# Patient Record
Sex: FEMALE | Race: BLACK OR AFRICAN AMERICAN | NOT HISPANIC OR LATINO | URBAN - METROPOLITAN AREA
[De-identification: names, ages, dates, MRNs, and addresses within clinical notes are randomized per-mention and may not be internally consistent; named-entity substitution may affect disease eponyms.]

---

## 2018-06-23 ENCOUNTER — INPATIENT (INPATIENT)
Facility: HOSPITAL | Age: 83
LOS: 0 days | Discharge: HOME HEALTH | DRG: 175 | End: 2018-06-24
Attending: INTERNAL MEDICINE | Admitting: INTERNAL MEDICINE
Payer: MEDICARE

## 2018-06-23 VITALS
TEMPERATURE: 98 F | RESPIRATION RATE: 20 BRPM | DIASTOLIC BLOOD PRESSURE: 67 MMHG | HEART RATE: 112 BPM | OXYGEN SATURATION: 98 % | SYSTOLIC BLOOD PRESSURE: 91 MMHG

## 2018-06-23 DIAGNOSIS — R26.2 DIFFICULTY IN WALKING, NOT ELSEWHERE CLASSIFIED: ICD-10-CM

## 2018-06-23 DIAGNOSIS — Z95.0 PRESENCE OF CARDIAC PACEMAKER: ICD-10-CM

## 2018-06-23 DIAGNOSIS — Z71.89 OTHER SPECIFIED COUNSELING: ICD-10-CM

## 2018-06-23 DIAGNOSIS — R56.9 UNSPECIFIED CONVULSIONS: ICD-10-CM

## 2018-06-23 DIAGNOSIS — R63.8 OTHER SYMPTOMS AND SIGNS CONCERNING FOOD AND FLUID INTAKE: ICD-10-CM

## 2018-06-23 DIAGNOSIS — I26.99 OTHER PULMONARY EMBOLISM WITHOUT ACUTE COR PULMONALE: ICD-10-CM

## 2018-06-23 DIAGNOSIS — N39.0 URINARY TRACT INFECTION, SITE NOT SPECIFIED: ICD-10-CM

## 2018-06-23 DIAGNOSIS — R64 CACHEXIA: ICD-10-CM

## 2018-06-23 DIAGNOSIS — I71.9 AORTIC ANEURYSM OF UNSPECIFIED SITE, WITHOUT RUPTURE: ICD-10-CM

## 2018-06-23 DIAGNOSIS — E78.5 HYPERLIPIDEMIA, UNSPECIFIED: ICD-10-CM

## 2018-06-23 DIAGNOSIS — Z29.9 ENCOUNTER FOR PROPHYLACTIC MEASURES, UNSPECIFIED: ICD-10-CM

## 2018-06-23 DIAGNOSIS — E04.9 NONTOXIC GOITER, UNSPECIFIED: ICD-10-CM

## 2018-06-23 DIAGNOSIS — Z95.0 PRESENCE OF CARDIAC PACEMAKER: Chronic | ICD-10-CM

## 2018-06-23 LAB
ALBUMIN SERPL ELPH-MCNC: 3.4 G/DL — SIGNIFICANT CHANGE UP (ref 3.3–5)
ALP SERPL-CCNC: 74 U/L — SIGNIFICANT CHANGE UP (ref 40–120)
ALT FLD-CCNC: 10 U/L — SIGNIFICANT CHANGE UP (ref 10–45)
ANION GAP SERPL CALC-SCNC: 9 MMOL/L — SIGNIFICANT CHANGE UP (ref 5–17)
APPEARANCE UR: CLEAR — SIGNIFICANT CHANGE UP
APTT BLD: 30 SEC — SIGNIFICANT CHANGE UP (ref 27.5–37.4)
AST SERPL-CCNC: 23 U/L — SIGNIFICANT CHANGE UP (ref 10–40)
BACTERIA # UR AUTO: ABNORMAL /HPF
BASOPHILS NFR BLD AUTO: 0.4 % — SIGNIFICANT CHANGE UP (ref 0–2)
BILIRUB SERPL-MCNC: 0.6 MG/DL — SIGNIFICANT CHANGE UP (ref 0.2–1.2)
BILIRUB UR-MCNC: NEGATIVE — SIGNIFICANT CHANGE UP
BUN SERPL-MCNC: 20 MG/DL — SIGNIFICANT CHANGE UP (ref 7–23)
CALCIUM SERPL-MCNC: 9.8 MG/DL — SIGNIFICANT CHANGE UP (ref 8.4–10.5)
CHLORIDE SERPL-SCNC: 97 MMOL/L — SIGNIFICANT CHANGE UP (ref 96–108)
CO2 SERPL-SCNC: 31 MMOL/L — SIGNIFICANT CHANGE UP (ref 22–31)
COLOR SPEC: YELLOW — SIGNIFICANT CHANGE UP
COMMENT - URINE: SIGNIFICANT CHANGE UP
CREAT SERPL-MCNC: 0.78 MG/DL — SIGNIFICANT CHANGE UP (ref 0.5–1.3)
DIFF PNL FLD: ABNORMAL
EOSINOPHIL NFR BLD AUTO: 1.1 % — SIGNIFICANT CHANGE UP (ref 0–6)
EPI CELLS # UR: ABNORMAL /HPF (ref 0–5)
GLUCOSE BLDC GLUCOMTR-MCNC: 84 MG/DL — SIGNIFICANT CHANGE UP (ref 70–99)
GLUCOSE BLDC GLUCOMTR-MCNC: 84 MG/DL — SIGNIFICANT CHANGE UP (ref 70–99)
GLUCOSE SERPL-MCNC: 95 MG/DL — SIGNIFICANT CHANGE UP (ref 70–99)
GLUCOSE UR QL: NEGATIVE — SIGNIFICANT CHANGE UP
HCT VFR BLD CALC: 33.8 % — LOW (ref 34.5–45)
HGB BLD-MCNC: 11.1 G/DL — LOW (ref 11.5–15.5)
INR BLD: 1.12 — SIGNIFICANT CHANGE UP (ref 0.88–1.16)
KETONES UR-MCNC: 15 MG/DL
LEUKOCYTE ESTERASE UR-ACNC: ABNORMAL
LYMPHOCYTES # BLD AUTO: 21 % — SIGNIFICANT CHANGE UP (ref 13–44)
MCHC RBC-ENTMCNC: 30.1 PG — SIGNIFICANT CHANGE UP (ref 27–34)
MCHC RBC-ENTMCNC: 32.8 G/DL — SIGNIFICANT CHANGE UP (ref 32–36)
MCV RBC AUTO: 91.6 FL — SIGNIFICANT CHANGE UP (ref 80–100)
MONOCYTES NFR BLD AUTO: 10.3 % — SIGNIFICANT CHANGE UP (ref 2–14)
NEUTROPHILS NFR BLD AUTO: 67.2 % — SIGNIFICANT CHANGE UP (ref 43–77)
NITRITE UR-MCNC: POSITIVE
PH UR: 5.5 — SIGNIFICANT CHANGE UP (ref 5–8)
PLATELET # BLD AUTO: 162 K/UL — SIGNIFICANT CHANGE UP (ref 150–400)
POTASSIUM SERPL-MCNC: 4 MMOL/L — SIGNIFICANT CHANGE UP (ref 3.5–5.3)
POTASSIUM SERPL-SCNC: 4 MMOL/L — SIGNIFICANT CHANGE UP (ref 3.5–5.3)
PROT SERPL-MCNC: 6.4 G/DL — SIGNIFICANT CHANGE UP (ref 6–8.3)
PROT UR-MCNC: ABNORMAL MG/DL
PROTHROM AB SERPL-ACNC: 12.5 SEC — SIGNIFICANT CHANGE UP (ref 9.8–12.7)
RBC # BLD: 3.69 M/UL — LOW (ref 3.8–5.2)
RBC # FLD: 12.3 % — SIGNIFICANT CHANGE UP (ref 10.3–16.9)
RBC CASTS # UR COMP ASSIST: > 10 /HPF
SODIUM SERPL-SCNC: 137 MMOL/L — SIGNIFICANT CHANGE UP (ref 135–145)
SP GR SPEC: 1.01 — SIGNIFICANT CHANGE UP (ref 1–1.03)
UROBILINOGEN FLD QL: 1 E.U./DL — SIGNIFICANT CHANGE UP
WBC # BLD: 4.8 K/UL — SIGNIFICANT CHANGE UP (ref 3.8–10.5)
WBC # FLD AUTO: 4.8 K/UL — SIGNIFICANT CHANGE UP (ref 3.8–10.5)
WBC UR QL: ABNORMAL /HPF

## 2018-06-23 PROCEDURE — 71045 X-RAY EXAM CHEST 1 VIEW: CPT | Mod: 26

## 2018-06-23 PROCEDURE — 99223 1ST HOSP IP/OBS HIGH 75: CPT | Mod: GC

## 2018-06-23 PROCEDURE — 99285 EMERGENCY DEPT VISIT HI MDM: CPT | Mod: 25

## 2018-06-23 PROCEDURE — 71275 CT ANGIOGRAPHY CHEST: CPT | Mod: 26

## 2018-06-23 PROCEDURE — 93010 ELECTROCARDIOGRAM REPORT: CPT

## 2018-06-23 RX ORDER — SODIUM CHLORIDE 9 MG/ML
1000 INJECTION, SOLUTION INTRAVENOUS
Qty: 0 | Refills: 0 | Status: DISCONTINUED | OUTPATIENT
Start: 2018-06-23 | End: 2018-06-24

## 2018-06-23 RX ORDER — HEPARIN SODIUM 5000 [USP'U]/ML
INJECTION INTRAVENOUS; SUBCUTANEOUS
Qty: 25000 | Refills: 0 | Status: DISCONTINUED | OUTPATIENT
Start: 2018-06-23 | End: 2018-06-24

## 2018-06-23 RX ORDER — ASPIRIN/CALCIUM CARB/MAGNESIUM 324 MG
1 TABLET ORAL
Qty: 0 | Refills: 0 | COMMUNITY

## 2018-06-23 RX ORDER — DEXTROSE 50 % IN WATER 50 %
25 SYRINGE (ML) INTRAVENOUS ONCE
Qty: 0 | Refills: 0 | Status: DISCONTINUED | OUTPATIENT
Start: 2018-06-23 | End: 2018-06-24

## 2018-06-23 RX ORDER — HEPARIN SODIUM 5000 [USP'U]/ML
2000 INJECTION INTRAVENOUS; SUBCUTANEOUS ONCE
Qty: 0 | Refills: 0 | Status: DISCONTINUED | OUTPATIENT
Start: 2018-06-23 | End: 2018-06-23

## 2018-06-23 RX ORDER — DEXTROSE 50 % IN WATER 50 %
12.5 SYRINGE (ML) INTRAVENOUS ONCE
Qty: 0 | Refills: 0 | Status: DISCONTINUED | OUTPATIENT
Start: 2018-06-23 | End: 2018-06-24

## 2018-06-23 RX ORDER — LEVETIRACETAM 250 MG/1
1 TABLET, FILM COATED ORAL
Qty: 0 | Refills: 0 | COMMUNITY

## 2018-06-23 RX ORDER — ASPIRIN/CALCIUM CARB/MAGNESIUM 324 MG
81 TABLET ORAL DAILY
Qty: 0 | Refills: 0 | Status: DISCONTINUED | OUTPATIENT
Start: 2018-06-23 | End: 2018-06-24

## 2018-06-23 RX ORDER — CEFUROXIME AXETIL 250 MG
250 TABLET ORAL ONCE
Qty: 0 | Refills: 0 | Status: COMPLETED | OUTPATIENT
Start: 2018-06-23 | End: 2018-06-23

## 2018-06-23 RX ORDER — ATORVASTATIN CALCIUM 80 MG/1
40 TABLET, FILM COATED ORAL AT BEDTIME
Qty: 0 | Refills: 0 | Status: DISCONTINUED | OUTPATIENT
Start: 2018-06-23 | End: 2018-06-24

## 2018-06-23 RX ORDER — HEPARIN SODIUM 5000 [USP'U]/ML
2000 INJECTION INTRAVENOUS; SUBCUTANEOUS EVERY 6 HOURS
Qty: 0 | Refills: 0 | Status: DISCONTINUED | OUTPATIENT
Start: 2018-06-23 | End: 2018-06-23

## 2018-06-23 RX ORDER — SODIUM CHLORIDE 9 MG/ML
500 INJECTION INTRAMUSCULAR; INTRAVENOUS; SUBCUTANEOUS ONCE
Qty: 0 | Refills: 0 | Status: COMPLETED | OUTPATIENT
Start: 2018-06-23 | End: 2018-06-23

## 2018-06-23 RX ORDER — CEFTRIAXONE 500 MG/1
1 INJECTION, POWDER, FOR SOLUTION INTRAMUSCULAR; INTRAVENOUS EVERY 24 HOURS
Qty: 0 | Refills: 0 | Status: DISCONTINUED | OUTPATIENT
Start: 2018-06-24 | End: 2018-06-24

## 2018-06-23 RX ORDER — LEVETIRACETAM 250 MG/1
500 TABLET, FILM COATED ORAL
Qty: 0 | Refills: 0 | Status: DISCONTINUED | OUTPATIENT
Start: 2018-06-23 | End: 2018-06-24

## 2018-06-23 RX ORDER — GLUCAGON INJECTION, SOLUTION 0.5 MG/.1ML
1 INJECTION, SOLUTION SUBCUTANEOUS ONCE
Qty: 0 | Refills: 0 | Status: DISCONTINUED | OUTPATIENT
Start: 2018-06-23 | End: 2018-06-24

## 2018-06-23 RX ORDER — DEXTROSE 50 % IN WATER 50 %
15 SYRINGE (ML) INTRAVENOUS ONCE
Qty: 0 | Refills: 0 | Status: DISCONTINUED | OUTPATIENT
Start: 2018-06-23 | End: 2018-06-24

## 2018-06-23 RX ORDER — HEPARIN SODIUM 5000 [USP'U]/ML
4000 INJECTION INTRAVENOUS; SUBCUTANEOUS EVERY 6 HOURS
Qty: 0 | Refills: 0 | Status: DISCONTINUED | OUTPATIENT
Start: 2018-06-23 | End: 2018-06-23

## 2018-06-23 RX ORDER — ATORVASTATIN CALCIUM 80 MG/1
1 TABLET, FILM COATED ORAL
Qty: 0 | Refills: 0 | COMMUNITY

## 2018-06-23 RX ORDER — INSULIN LISPRO 100/ML
VIAL (ML) SUBCUTANEOUS
Qty: 0 | Refills: 0 | Status: DISCONTINUED | OUTPATIENT
Start: 2018-06-23 | End: 2018-06-24

## 2018-06-23 RX ADMIN — Medication 250 MILLIGRAM(S): at 19:53

## 2018-06-23 RX ADMIN — SODIUM CHLORIDE 1000 MILLILITER(S): 9 INJECTION INTRAMUSCULAR; INTRAVENOUS; SUBCUTANEOUS at 15:37

## 2018-06-23 RX ADMIN — HEPARIN SODIUM 900 UNIT(S)/HR: 5000 INJECTION INTRAVENOUS; SUBCUTANEOUS at 21:32

## 2018-06-23 NOTE — ED PROVIDER NOTE - OBJECTIVE STATEMENT
89 yo female h/o sz on keppra, hld, htn, arthritis, s/p pacer, bed bound s/p fall Nov 2017 c/o choking episode.  Pt was at home, took her medications and shortly afterward was noted to be having problems breathing.  Family called 911 and pt was brought to the ed.  In ed, pt appears to be breathing normally per family.  Pt c/o recent sore throat x 2 d, no cough, fever, sob prior to today.  Pt has chronic pain in body - unchanged today.  No cp, palpitations.  No abd pain, n/v/d.  No h/o lung issues. 89 yo female h/o sz on keppra, hld, htn, arthritis, s/p pacer, bed bound s/p fall Nov 2017 c/o choking episode.  Pt was at home, took her medications and shortly afterward was noted to be having problems breathing.  Family called 911 and pt was brought to the ed.  In ed, pt appears to be breathing normally per family.  Pt c/o recent sore throat x 2 d, no cough, fever, sob prior to today.  Pt has chronic pain in body - unchanged today.  No cp, palpitations.  No abd pain, n/v/d.  No h/o lung issues.  No sz activity noted.

## 2018-06-23 NOTE — ED PROVIDER NOTE - MEDICAL DECISION MAKING DETAILS
Pt w likely choking episode at home.  Sat 100% on ra on my eval, lung cta, no resp distress.  BP low on arrival but nl on my exam.  Will monitor vs for deterioration or fever from ? aspiration, cxr, labs; poss dc home. Pt w likely choking episode at home.  Sat 100% on ra on my eval, lung cta, no resp distress.  BP low on arrival but nl on my exam.  Will monitor vs for deterioration or fever from ? aspiration, cxr, labs, low suspicion for pe (pt bedbound); poss dc home. Pt w likely choking episode at home.  Sat 100% on ra on my eval, lung cta, no resp distress.  BP low on arrival but nl on my exam.  Will monitor vs for deterioration or fever from ? aspiration.  Low suspicion for pe (although some risk since pt bedbound).  Plan cxr, labs; poss dc home.

## 2018-06-23 NOTE — ED PROVIDER NOTE - PROGRESS NOTE DETAILS
Pt resting comfortably.  Sat 91-95% while sleeping, family feels pt's breathing labored but appears nl to me.  Labs nl.  CXR w scar vs atelectasis on my read.  Doubt pe but pt w lower sat now which could be due to her sleeping, but will get cta for pe.  If neg, plan dc home. + uti - abx ordered.  CT + pe, no evidence of R heart strain.  BNP, trop added to labs; plan heparin after pt weighed.  Plan for admit but need bnp/trop to eval for floor vs tele bed; no sig suspicion for submassive pe.  VSS, pt resting comfortably w/o resp distress. Trop neg, bnp nl for pt's age.  Will admit to floor.

## 2018-06-23 NOTE — ED PROVIDER NOTE - ENMT, MLM
Airway patent, Nasal mucosa clear. Mouth with normal mucosa. Throat has no vesicles, no oropharyngeal exudates and uvula is midline. no fb, no erythema

## 2018-06-23 NOTE — H&P ADULT - PROBLEM SELECTOR PLAN 6
nutritional consult CTA: Large substernal thyroid goiter  - thyroid panel c/w Lipitor 40 Family uncertain patient really has seizures. Currently on Keppra 500mg BID  c/w keppra 500mg BID  provide neurologist at discharge- patients family request transition from [facility] to [facility]

## 2018-06-23 NOTE — H&P ADULT - ATTENDING COMMENTS
patient seen and examined; additional HPI/ ROS: pt reports being weaker and bedbound for the last month. Has been having loss of appetite x 2 weeks, urinary frequency x1 month, experienced one time vomiting 1 week ago. denies chest pain/ dyspnea currently    reviewed vs, labs, available radiological reports/ studies, ekg     agree w/ PE findings as above w/ additions/ exceptions/ pertinent findings: thin, elderly female, soft spoken, awake, alert, oriented x 3; mildly dry MM, moves all extremities; rest of exam as above     1. PE   2. UTI   3.  cachexia/ NEIL/ LOW       rest of plan as above patient seen and examined; additional HPI/ ROS: pt reports being weaker and bedbound for the last month, usually ambulates with walker, also uses wheelchair. Has been having loss of appetite x 2 weeks, urinary frequency x1 month, experienced one time vomiting 1 week ago. denies chest pain/ dyspnea currently; reports urinary incontinence while bedbound.     reviewed vs, labs, available radiological reports/ studies, ekg     agree w/ PE findings as above w/ additions/ exceptions/ pertinent findings: thin, elderly female, soft spoken, awake, alert, oriented x 3; mildly dry MM, no focal CN motor deficit noted; 3/5 upper motor strength b/l, 2/5 lower motor strength b/l ; rest of exam as above     1. PE : on heparin gtt, monitor PTT; agree w/ dopplers, ECHO   2. UTI : on ceftriaxone, follow up ctx  3.  weakness/ cachexia/ NEIL/ LOW : upper and lower motor weakness, more pronounced at lower ext, occurring for the last month; given acuity , will check  brain and spine imaging   4. goiter: check thyroid studies, further sonogram imaging       rest of plan as above patient seen and examined; additional HPI/ ROS: pt reports being weaker and bedbound for the last month, usually ambulates with walker, also uses wheelchair. Has been having loss of appetite x 2 weeks, urinary frequency x1 month, experienced one time vomiting 1 week ago. denies chest pain/ dyspnea currently; reports urinary incontinence while bedbound.     reviewed vs, labs, available radiological reports/ studies, ekg     agree w/ PE findings as above w/ additions/ exceptions/ pertinent findings: thin, elderly female, soft spoken, awake, alert, oriented x 3; mildly dry MM, no focal CN motor deficit noted; 3/5 upper motor strength b/l, 2/5 lower motor strength b/l ; rest of exam as above     1. PE : on heparin gtt, monitor PTT; agree w/ dopplers, ECHO   2. UTI : on ceftriaxone, follow up ctxs  3.  weakness/ cachexia/ NEIL/ LOW : upper and lower motor weakness, more pronounced at lower ext, occurring for the last month; per family pt has been bedbound since 11/2017 after a fall; appears chronically deconditioned; further workup as outpatient.   4. large substernal goiter: check thyroid studies, further sonogram imaging       rest of plan as above

## 2018-06-23 NOTE — H&P ADULT - NSHPPHYSICALEXAM_GEN_ALL_CORE
.  VITAL SIGNS:  T(C): 36.7 (06-23-18 @ 18:49), Max: 37.3 (06-23-18 @ 14:48)  T(F): 98.1 (06-23-18 @ 18:49), Max: 99.1 (06-23-18 @ 14:48)  HR: 80 (06-23-18 @ 18:49) (80 - 112)  BP: 144/88 (06-23-18 @ 18:49) (91/67 - 144/88)  BP(mean): --  RR: 18 (06-23-18 @ 18:49) (18 - 20)  SpO2: 100% (06-23-18 @ 18:49) (93% - 100%)  Wt(kg): --    PHYSICAL EXAM:    Constitutional: cachectic, in no acute distress  Head: temporal wasting   Eyes: PERRL, EOMI, clear conjunctiva  ENT: dry mucous membranes   Neck: supple; no JVD   Respiratory: CTA B/L; no W/R/R  Cardiac: +S1/S2; RRR; no M/R/G  Gastrointestinal: soft, NT/ND; no rebound or guarding  Extremities: WWP, no clubbing or cyanosis; no peripheral edema  Musculoskeletal: no joint swelling, tenderness or erythema  Vascular: 2+ radial, femoral, DP/PT pulses B/L  Neurologic: AAOx3; no focal deficits

## 2018-06-23 NOTE — H&P ADULT - PROBLEM SELECTOR PLAN 5
c/w Lipitor 40 Family uncertain patient really has seizures. Currently on Keppra 500mg BID  c/w keppra 500mg BID  provide neurologist at discharge- patients family request transition from [facility] to [facility] Visualized on CXR. EKG: NSR, HR 99, LAD, RBBB, 1st deg AV block (BiFasicular Block), LVH, ? inf q-waves?.  - repeat EKG in AM

## 2018-06-23 NOTE — ED ADULT NURSE NOTE - OBJECTIVE STATEMENT
89 y/o female BIBEMS accompanied by family with c/o SOB , as per pt and family she was eating and then "felt like a piece of food was stuck and couldn't breath".  upon assessment pt awake and alert, oriented x4,  able to speak clearly, no respiratory distress noted. denies pain, VS stable and on 2l Oxygen via nasal cannula. as per family pt bedbound. pt felt warm to touch, rectal temp done, pt found incontinent of urine, perianal care provided and linen changed. MD Director at bedside. pt kept on cardiac monitor, Will observe closely. 89 y/o female BIBEMS accompanied by family with c/o SOB , as per pt and family she was eating and then "felt like a piece of food was stuck and couldn't breath".  upon assessment pt awake and alert, oriented x4,  able to speak clearly, no respiratory distress noted. denies pain, VS stable and on 2l Oxygen via nasal cannula. as per family pt bedbound. pt felt warm to touch, rectal temp done, pt found incontinent of urine, perianal care provided and linen changed. Healed sacral ulcer noted. MD Director at bedside. pt kept on cardiac monitor, Will observe closely.

## 2018-06-23 NOTE — H&P ADULT - NSHPLABSRESULTS_GEN_ALL_CORE
< from: CT Angio Chest PE Protocol w/ IV Cont (06.23.18 @ 18:53) >    EXAM:  CT ANGIO CHEST PE PROTOCOL IC                          PROCEDURE DATE:  06/23/2018          INTERPRETATION:  CTA (CT angiography) of the CHEST dated 6/23/2018 6:53 PM    INDICATION: Acute shortness of breath. Assess for pulmonary embolism.    TECHNIQUE: CT angiography of the chest was performed during bolus   injection of intravenous contrast.  Post-processing including the   production of axial, coronal and sagittal multiplanar reformatted images   and axial and coronal maximum intensity projections (MIPs) was performed.    PRIOR STUDY: None.    FINDINGS: Pulmonary emboli are noted within the segmental/subsegmental   branches of the right lower lobe. The main pulmonary artery is normal in   caliber. However there is borderline dilation of the right main pulmonary   artery to 2.5 cm, which may represent the presence of pulmonary   hypertension. No evidence of right heart strain. There is aneurysmal   dilatation of the aortic arch measuring up to 3.9 cm. The descending   thoracic aorta is tortuous in its course. The remaining visualized   thoracic aorta is normal in appearance. The heart is normal in size. Left   ventricular hypertrophy is noted. An implantable pacemaker is noted with   its leads within the right atrium and right ventricle. Aortic valvular   and coronary arterial calcifications are noted. No pericardial effusion   is seen. No mediastinal, hilar or axillary lymphadenopathy is seen.    Evaluation of the lower neck demonstrates an incompletely visualized   large heterogeneous mass arising from the thyroid and extending   inferiorly into the mediastinum measuring at least 8.6 x 6.1 x 9.7 cm.   There is an adjacent inferior similar attenuating structure measuring 3.6   x 2.9 x 1.7 cm. These both likely represent a thyroid goiter. There is   mass effect on the trachea and esophagus in this region.    No pleural effusions are seen. Bilateral pleural parenchymal scarring is   noted. Linear atelectasis is noted within the right middle and lower   lobes bilaterally. Mucous plugging is noted within the left upper lobe   (series 9, image 91). The central airways are patent.    Limited evaluation of the upper abdomen demonstrates no acute   abnormality.     Evaluation of the osseous structures demonstrates mild degenerative   changes.    IMPRESSION:   1.  Pulmonary emboli are noted within the segmental/subsegmental branches   of the right lower lobe. No CT evidence of right heart strain.  2.  The right main pulmonary artery is at the upper limit of normal   measuring 2.5 cm, which may indicate the presence of pulmonary   hypertension.  3.  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  4.  Large substernal thyroid goiter as above.    < end of copied text >

## 2018-06-23 NOTE — H&P ADULT - PROBLEM SELECTOR PLAN 8
Hep gtt DNR/ DNI CTA : Aneurysmal dilatation of the aortic arch measuring up to 3.9 cm.  - will need outpatient follow up CTA: Large substernal thyroid goiter  - thyroid panel

## 2018-06-23 NOTE — H&P ADULT - PROBLEM SELECTOR PLAN 9
monitor/ replete lytes  no IVF  Reg Diet, w/ Ensure     DNR/ DNI Hep gtt CTA : Aneurysmal dilatation of the aortic arch measuring up to 3.9 cm.  - will need outpatient follow up

## 2018-06-23 NOTE — H&P ADULT - ASSESSMENT
89 year old female, bedbound after fall (Nov 2017), w/ pmhx of HTN, HLD, Seizures, pacemaker, arthritis, BIBEMS after probable choking episode at home

## 2018-06-23 NOTE — H&P ADULT - PROBLEM SELECTOR PLAN 1
CTA: segmental & subsegmental pulmonary emboli without CT evidence of RV strain. Normotensive w/ 's systolic. Not submassive or massive. In no respiratory distress, currently saturating well on room air. Etiology likely due to immobility from being bedbound since Nov 2017.  - Echocardiogram to assess RV function   - LE Dopplers  - Heparin gtt @ 9u/hr. Repeat PTT's q6h to keep therapeutic   - monitor resp status

## 2018-06-23 NOTE — H&P ADULT - PROBLEM SELECTOR PLAN 10
monitor/ replete lytes  no IVF  Reg Diet, w/ Ensure     DNR/ DNI monitor/ replete lytes  no IVF  Reg Diet, w/ Ensure   PPX: on hep gtt   DNR/ DNI

## 2018-06-23 NOTE — ED PROVIDER NOTE - CONSTITUTIONAL, MLM
normal... thin, well nourished, awake, alert, oriented to person, place, time/situation and in no apparent distress. speaking full sentences, no drool/stridor, no resp distress

## 2018-06-23 NOTE — ED PROVIDER NOTE - DIAGNOSTIC INTERPRETATION
ER Physician:  Delores Director  CHEST XRAY INTERPRETATION: scarring vs atelectasis r costophrenic angle, tortuous aorta similar to prev, bony structures intact, pacer L chest

## 2018-06-23 NOTE — H&P ADULT - PROBLEM SELECTOR PLAN 3
Visualized on CXR. EKG: NSR, HR 99, LAD, RBBB, 1st deg AV block (BiFasicular Block), LVH, ? inf q-waves?.  - repeat EKG in AM nonspecific sxs of loss of appetite, weakness for one month. Nutrition consult, PT evaluation; will need further workup as outpatient re: NEIL / LOW/ weakness. nonspecific sxs of loss of appetite, weakness; bedbound since 11/17. Nutrition consult, PT evaluation; will need further workup as outpatient re: NEIL / LOW/ weakness.

## 2018-06-23 NOTE — ED ADULT NURSE REASSESSMENT NOTE - NS ED NURSE REASSESS COMMENT FT1
Pt back from CT scan, IV site infiltrated, red and swollen noted, IV line removed and ice applied to the area. denies any pain. Pt able to provided urine sample and remains under stable condition. Awaiting results. will continue to monitor

## 2018-06-23 NOTE — H&P ADULT - PROBLEM SELECTOR PLAN 2
Denies dysuria or urinary freq. + UA. s/p Cefuroxime in ED  - c/w Ceftriaxone x 3 days   - f/u urine culture

## 2018-06-23 NOTE — H&P ADULT - PROBLEM SELECTOR PLAN 4
Family uncertain patient really has seizures. Currently on Keppra 500mg BID  c/w keppra 500mg BID  provide neurologist at discharge- patients family request transition from [facility] to [facility] Visualized on CXR. EKG: NSR, HR 99, LAD, RBBB, 1st deg AV block (BiFasicular Block), LVH, ? inf q-waves?.  - repeat EKG in AM new onset in the last month; check brain/ spine imaging per family pt has been bedbound since 11/2017 after a fall; PT evaluation; further evaluation as outpatient

## 2018-06-23 NOTE — H&P ADULT - HISTORY OF PRESENT ILLNESS
89 year old female, bedbound after fall (Nov 2017), w/ pmhx of HTN, HLD, Seizures, pacemaker, arthritis, BIBEMS after probable choking episode at home. Patient was laying down in bed when she began choking for several minutes after taking her medications. Episode was witnessed by daughter and granddaughter, who is at bedside. Family sat her up and slapped her back when she began breathing normally. EMS was called and brought patient here for evaluation. Patient denies any fevers, chills, chest pain, shortness of breath, coughing, abdominal pain, nausea, vomiting, urinary symptoms, or bowel changes. Upon arrival, temp: 98.2, HR: 112, BP 91/67 (repeat: HR 88, /75), RR: 20, O2: 91-98% on room air. Patient underwent CTA which was positive for segmental and subsegmental pulmonary emboli, without RV dilation (on CT). Troponin negative BNP minimally elevated. Patient started on Heparin gtt and admitted to [facility] for further management

## 2018-06-23 NOTE — H&P ADULT - PROBLEM SELECTOR PLAN 7
DNR/ DNI CTA : Aneurysmal dilatation of the aortic arch measuring up to 3.9 cm.  - will need outpatient follow up CTA: Large substernal thyroid goiter  - thyroid panel c/w Lipitor 40

## 2018-06-24 VITALS
RESPIRATION RATE: 20 BRPM | HEART RATE: 98 BPM | OXYGEN SATURATION: 96 % | DIASTOLIC BLOOD PRESSURE: 84 MMHG | TEMPERATURE: 98 F | SYSTOLIC BLOOD PRESSURE: 157 MMHG

## 2018-06-24 LAB
ANION GAP SERPL CALC-SCNC: 16 MMOL/L — SIGNIFICANT CHANGE UP (ref 5–17)
APTT BLD: 164 SEC — CRITICAL HIGH (ref 27.5–37.4)
APTT BLD: 64.3 SEC — HIGH (ref 27.5–37.4)
BUN SERPL-MCNC: 14 MG/DL — SIGNIFICANT CHANGE UP (ref 7–23)
CALCIUM SERPL-MCNC: 10 MG/DL — SIGNIFICANT CHANGE UP (ref 8.4–10.5)
CHLORIDE SERPL-SCNC: 95 MMOL/L — LOW (ref 96–108)
CO2 SERPL-SCNC: 26 MMOL/L — SIGNIFICANT CHANGE UP (ref 22–31)
CREAT SERPL-MCNC: 0.64 MG/DL — SIGNIFICANT CHANGE UP (ref 0.5–1.3)
GLUCOSE BLDC GLUCOMTR-MCNC: 124 MG/DL — HIGH (ref 70–99)
GLUCOSE BLDC GLUCOMTR-MCNC: 82 MG/DL — SIGNIFICANT CHANGE UP (ref 70–99)
GLUCOSE SERPL-MCNC: 80 MG/DL — SIGNIFICANT CHANGE UP (ref 70–99)
HBA1C BLD-MCNC: 5.1 % — SIGNIFICANT CHANGE UP (ref 4–5.6)
HCT VFR BLD CALC: 30.7 % — LOW (ref 34.5–45)
HGB BLD-MCNC: 10.4 G/DL — LOW (ref 11.5–15.5)
INR BLD: 1.14 — SIGNIFICANT CHANGE UP (ref 0.88–1.16)
MAGNESIUM SERPL-MCNC: 1.6 MG/DL — SIGNIFICANT CHANGE UP (ref 1.6–2.6)
MCHC RBC-ENTMCNC: 29.6 PG — SIGNIFICANT CHANGE UP (ref 27–34)
MCHC RBC-ENTMCNC: 33.9 G/DL — SIGNIFICANT CHANGE UP (ref 32–36)
MCV RBC AUTO: 87.5 FL — SIGNIFICANT CHANGE UP (ref 80–100)
PHOSPHATE SERPL-MCNC: 2.1 MG/DL — LOW (ref 2.5–4.5)
PLATELET # BLD AUTO: 170 K/UL — SIGNIFICANT CHANGE UP (ref 150–400)
POTASSIUM SERPL-MCNC: 3.5 MMOL/L — SIGNIFICANT CHANGE UP (ref 3.5–5.3)
POTASSIUM SERPL-SCNC: 3.5 MMOL/L — SIGNIFICANT CHANGE UP (ref 3.5–5.3)
PROTHROM AB SERPL-ACNC: 12.7 SEC — SIGNIFICANT CHANGE UP (ref 9.8–12.7)
RBC # BLD: 3.51 M/UL — LOW (ref 3.8–5.2)
RBC # FLD: 12.4 % — SIGNIFICANT CHANGE UP (ref 10.3–16.9)
SODIUM SERPL-SCNC: 137 MMOL/L — SIGNIFICANT CHANGE UP (ref 135–145)
TSH SERPL-MCNC: 0.99 UIU/ML — SIGNIFICANT CHANGE UP (ref 0.35–4.94)
WBC # BLD: 5.5 K/UL — SIGNIFICANT CHANGE UP (ref 3.8–10.5)
WBC # FLD AUTO: 5.5 K/UL — SIGNIFICANT CHANGE UP (ref 3.8–10.5)

## 2018-06-24 PROCEDURE — 82553 CREATINE MB FRACTION: CPT

## 2018-06-24 PROCEDURE — 93005 ELECTROCARDIOGRAM TRACING: CPT

## 2018-06-24 PROCEDURE — 83036 HEMOGLOBIN GLYCOSYLATED A1C: CPT

## 2018-06-24 PROCEDURE — 71045 X-RAY EXAM CHEST 1 VIEW: CPT

## 2018-06-24 PROCEDURE — 83735 ASSAY OF MAGNESIUM: CPT

## 2018-06-24 PROCEDURE — 85730 THROMBOPLASTIN TIME PARTIAL: CPT

## 2018-06-24 PROCEDURE — 85610 PROTHROMBIN TIME: CPT

## 2018-06-24 PROCEDURE — 82550 ASSAY OF CK (CPK): CPT

## 2018-06-24 PROCEDURE — 99239 HOSP IP/OBS DSCHRG MGMT >30: CPT

## 2018-06-24 PROCEDURE — 84100 ASSAY OF PHOSPHORUS: CPT

## 2018-06-24 PROCEDURE — 81001 URINALYSIS AUTO W/SCOPE: CPT

## 2018-06-24 PROCEDURE — 80053 COMPREHEN METABOLIC PANEL: CPT

## 2018-06-24 PROCEDURE — 85027 COMPLETE CBC AUTOMATED: CPT

## 2018-06-24 PROCEDURE — 84484 ASSAY OF TROPONIN QUANT: CPT

## 2018-06-24 PROCEDURE — 87086 URINE CULTURE/COLONY COUNT: CPT

## 2018-06-24 PROCEDURE — 87186 SC STD MICRODIL/AGAR DIL: CPT

## 2018-06-24 PROCEDURE — 82962 GLUCOSE BLOOD TEST: CPT

## 2018-06-24 PROCEDURE — 36415 COLL VENOUS BLD VENIPUNCTURE: CPT

## 2018-06-24 PROCEDURE — 71275 CT ANGIOGRAPHY CHEST: CPT

## 2018-06-24 PROCEDURE — 83880 ASSAY OF NATRIURETIC PEPTIDE: CPT

## 2018-06-24 PROCEDURE — 85025 COMPLETE CBC W/AUTO DIFF WBC: CPT

## 2018-06-24 PROCEDURE — 99285 EMERGENCY DEPT VISIT HI MDM: CPT | Mod: 25

## 2018-06-24 PROCEDURE — 84443 ASSAY THYROID STIM HORMONE: CPT

## 2018-06-24 PROCEDURE — 80048 BASIC METABOLIC PNL TOTAL CA: CPT

## 2018-06-24 RX ORDER — RIVAROXABAN 15 MG-20MG
1 KIT ORAL
Qty: 30 | Refills: 2 | OUTPATIENT
Start: 2018-06-24 | End: 2018-09-21

## 2018-06-24 RX ORDER — HEPARIN SODIUM 5000 [USP'U]/ML
5 INJECTION INTRAVENOUS; SUBCUTANEOUS
Qty: 25000 | Refills: 0 | Status: DISCONTINUED | OUTPATIENT
Start: 2018-06-24 | End: 2018-06-24

## 2018-06-24 RX ORDER — CEFTRIAXONE 500 MG/1
1 INJECTION, POWDER, FOR SOLUTION INTRAMUSCULAR; INTRAVENOUS ONCE
Qty: 0 | Refills: 0 | Status: DISCONTINUED | OUTPATIENT
Start: 2018-06-24 | End: 2018-06-24

## 2018-06-24 RX ORDER — HEPARIN SODIUM 5000 [USP'U]/ML
500 INJECTION INTRAVENOUS; SUBCUTANEOUS
Qty: 25000 | Refills: 0 | Status: DISCONTINUED | OUTPATIENT
Start: 2018-06-24 | End: 2018-06-24

## 2018-06-24 RX ORDER — CEFTRIAXONE 500 MG/1
1000 INJECTION, POWDER, FOR SOLUTION INTRAMUSCULAR; INTRAVENOUS ONCE
Qty: 0 | Refills: 0 | Status: COMPLETED | OUTPATIENT
Start: 2018-06-24 | End: 2018-06-24

## 2018-06-24 RX ORDER — RIVAROXABAN 15 MG-20MG
15 KIT ORAL DAILY
Qty: 0 | Refills: 0 | Status: DISCONTINUED | OUTPATIENT
Start: 2018-06-24 | End: 2018-06-24

## 2018-06-24 RX ORDER — CEFTRIAXONE 500 MG/1
1000 INJECTION, POWDER, FOR SOLUTION INTRAMUSCULAR; INTRAVENOUS EVERY 24 HOURS
Qty: 0 | Refills: 0 | Status: DISCONTINUED | OUTPATIENT
Start: 2018-06-24 | End: 2018-06-24

## 2018-06-24 RX ORDER — CEFPODOXIME PROXETIL 100 MG
1 TABLET ORAL
Qty: 12 | Refills: 0 | OUTPATIENT
Start: 2018-06-24 | End: 2018-06-29

## 2018-06-24 RX ADMIN — RIVAROXABAN 15 MILLIGRAM(S): KIT at 12:52

## 2018-06-24 RX ADMIN — ATORVASTATIN CALCIUM 40 MILLIGRAM(S): 80 TABLET, FILM COATED ORAL at 00:01

## 2018-06-24 RX ADMIN — Medication 81 MILLIGRAM(S): at 09:45

## 2018-06-24 RX ADMIN — HEPARIN SODIUM 5 UNIT(S)/HR: 5000 INJECTION INTRAVENOUS; SUBCUTANEOUS at 06:25

## 2018-06-24 RX ADMIN — Medication 1 TABLET(S): at 09:45

## 2018-06-24 RX ADMIN — CEFTRIAXONE 1000 MILLIGRAM(S): 500 INJECTION, POWDER, FOR SOLUTION INTRAMUSCULAR; INTRAVENOUS at 16:00

## 2018-06-24 RX ADMIN — LEVETIRACETAM 500 MILLIGRAM(S): 250 TABLET, FILM COATED ORAL at 06:23

## 2018-06-24 NOTE — DIETITIAN INITIAL EVALUATION ADULT. - PROBLEM SELECTOR PLAN 9
CTA : Aneurysmal dilatation of the aortic arch measuring up to 3.9 cm.  - will need outpatient follow up

## 2018-06-24 NOTE — DISCHARGE NOTE ADULT - ADDITIONAL INSTRUCTIONS
- Please go to your Primary Care Physician within a week of discharge.  - Take your medications as prescribed.   - If any signs of bleed noticed, call your primary care physician right away or go to the nearest emergency room.

## 2018-06-24 NOTE — DISCHARGE NOTE ADULT - CARE PLAN
Principal Discharge DX:	Other acute pulmonary embolism without acute cor pulmonale  Goal:	Medication compliance  Assessment and plan of treatment:	You were brought in for evaluation of symptoms that have already resolved but you were found to have a pulmonary embolism, which is a blood clot in your lungs. No signs of hemodynamic instability were noted. For this, you were started on a heparin drip  Secondary Diagnosis:	Acute cystitis without hematuria Principal Discharge DX:	Other acute pulmonary embolism without acute cor pulmonale  Goal:	Medication compliance  Assessment and plan of treatment:	You were brought in for evaluation of symptoms that have already resolved but you were found to have a pulmonary embolism, which is a blood clot in your lungs. No signs of hemodynamic instability were noted. For this, you were started on a heparin drip and then switched to an oral anticoagulant. Please monitor for any signs of bleed such as blood in stools, bruising, etc, and tell your doctor if you notice any of those signs. Also, follow up with your PMD to monitor your renal function/creatinine clearance to make sure it does not decrease even further, since you are in this medication (Xarelto) and it requires a minimum renal function, and yours is already decreased.  Secondary Diagnosis:	Acute cystitis without hematuria  Assessment and plan of treatment:	You were treated with 3 days of intravenous antibiotics while in the hospital. Please continue taking the oral antibiotics for a total of 7 days. Today you had the 3rd dose, so please resume taking them tomorrow for 4 days to complete a 7 days course.  Secondary Diagnosis:	Cachexia  Assessment and plan of treatment:	During your stay, our nutrition team evaluated you given your decrease weight. Their recommendations include:  - To continue current regular diet, honoring food preferences  - To continue Ensure Enlive 3 times a day to provide an additional calories  - To continue multivitamin daily  - Appreciate continued assistance and encouragement with meals to promote oral intake  Secondary Diagnosis:	Seizure  Assessment and plan of treatment:	Continue taking your medications as prescribed  Secondary Diagnosis:	Other hyperlipidemia  Assessment and plan of treatment:	Continue taking your medications as prescribed

## 2018-06-24 NOTE — DIETITIAN INITIAL EVALUATION ADULT. - PROBLEM SELECTOR PLAN 3
nonspecific sxs of loss of appetite, weakness; bedbound since 11/17. Nutrition consult, PT evaluation; will need further workup as outpatient re: NEIL / LOW/ weakness.

## 2018-06-24 NOTE — DIETITIAN INITIAL EVALUATION ADULT. - ENERGY NEEDS
IBW used as pt is currently less than 80% ideal body weight  Increased needs secondary to severely underweight BMI and suspected, recent weight loss

## 2018-06-24 NOTE — DISCHARGE NOTE ADULT - PLAN OF CARE
Medication compliance You were brought in for evaluation of symptoms that have already resolved but you were found to have a pulmonary embolism, which is a blood clot in your lungs. No signs of hemodynamic instability were noted. For this, you were started on a heparin drip You were brought in for evaluation of symptoms that have already resolved but you were found to have a pulmonary embolism, which is a blood clot in your lungs. No signs of hemodynamic instability were noted. For this, you were started on a heparin drip and then switched to an oral anticoagulant. Please monitor for any signs of bleed such as blood in stools, bruising, etc, and tell your doctor if you notice any of those signs. Also, follow up with your PMD to monitor your renal function/creatinine clearance to make sure it does not decrease even further, since you are in this medication (Xarelto) and it requires a minimum renal function, and yours is already decreased. You were treated with 3 days of intravenous antibiotics while in the hospital. Please continue taking the oral antibiotics for a total of 7 days. Today you had the 3rd dose, so please resume taking them tomorrow for 4 days to complete a 7 days course. During your stay, our nutrition team evaluated you given your decrease weight. Their recommendations include:  - To continue current regular diet, honoring food preferences  - To continue Ensure Enlive 3 times a day to provide an additional calories  - To continue multivitamin daily  - Appreciate continued assistance and encouragement with meals to promote oral intake Continue taking your medications as prescribed

## 2018-06-24 NOTE — DIETITIAN INITIAL EVALUATION ADULT. - PROBLEM SELECTOR PLAN 4
per family pt has been bedbound since 11/2017 after a fall; PT evaluation; further evaluation as outpatient

## 2018-06-24 NOTE — CHART NOTE - NSCHARTNOTEFT_GEN_A_CORE
Critical value of . Holding heparin gtt x 30 minutes and re-start at 5u/hr  Repeating PTT @ 5:30am and will continue to repeat q4-6h until therapeutic Critical value of . Holding heparin gtt x 30 minutes and re-start at 5ml/hr (previously 9ml/hr)  Repeating PTT @ 5:30am and will continue to repeat q4-6h until therapeutic

## 2018-06-24 NOTE — DIETITIAN INITIAL EVALUATION ADULT. - NUTRITION INTERVENTION
Feeding Assistance/Meals and Snack/Medical Food Supplements/Vitamin/Nutrition Education/Collaboration and Referral of Nutrition Care

## 2018-06-24 NOTE — DIETITIAN INITIAL EVALUATION ADULT. - OTHER INFO
Pt was BIBEMS s/p probably choking episode; found with pulmonary embolism and UTI.  Pt is AAOx3, however, can be a poor historian at times.  Grandson was at bedside who helped to clarify.  Pt endorses decreased appetite over the last 2 weeks and UBW ~169lbs x2 weeks ago.  Question accuracy of this information as is very unlikely pt lost >60lbs over the last two weeks.  Suspect gradual weight loss over a longer period of time.  Pt endorses improving appetite - fair at this time; consuming <50% of meals and ONS.  Pt denies GI distress or pain.  NKFA.  Skin: ayaan 14; at risk for impairment to skin integrity as pt has been bedbound since 11/2017.

## 2018-06-24 NOTE — DISCHARGE NOTE ADULT - PATIENT PORTAL LINK FT
You can access the [facility] Patient Portal, offered by [facility], by registering with the following website: [email or web address]

## 2018-06-24 NOTE — DISCHARGE NOTE ADULT - MEDICATION SUMMARY - MEDICATIONS TO TAKE
I will START or STAY ON the medications listed below when I get home from the hospital:    aspirin 81 mg oral tablet, chewable  -- 1 tab(s) by mouth once a day  -- Indication: For Prophylaxis    rivaroxaban 15 mg oral tablet  -- 1 tab(s) by mouth once a day  -- Indication: For Pulmonary embolism    Keppra 500 mg oral tablet  -- 1 tab(s) by mouth 2 times a day  -- Indication: For Seizures    Lipitor 40 mg oral tablet  -- 1 tab(s) by mouth once a day  -- Indication: For HLD (hyperlipidemia)    cefpodoxime 100 mg oral tablet  -- 1 tab(s) by mouth 2 times a day   -- Finish all this medication unless otherwise directed by prescriber.  Take with food or milk.    -- Indication: For UTI (urinary tract infection)    Multiple Vitamins oral tablet, chewable  -- 1 tab(s) by mouth once a day  -- Indication: For Nutrition, metabolism, and development symptoms

## 2018-06-24 NOTE — DIETITIAN INITIAL EVALUATION ADULT. - PROBLEM SELECTOR PLAN 5
Visualized on CXR. EKG: NSR, HR 99, LAD, RBBB, 1st deg AV block (BiFasicular Block), LVH, ? inf q-waves?.  - repeat EKG in AM

## 2018-06-24 NOTE — CHART NOTE - NSCHARTNOTEFT_GEN_A_CORE
Upon Nutritional Assessment by the Registered Dietitian your patient was determined to meet criteria / has evidence of the following diagnosis/diagnoses:          [ ]  Mild Protein Calorie Malnutrition        [ ]  Moderate Protein Calorie Malnutrition        [ ] Severe Protein Calorie Malnutrition        [ ] Unspecified Protein Calorie Malnutrition        [x ] Underweight / BMI <19        [ ] Morbid Obesity / BMI > 40    BMI 13.6    Pt endorses decreased appetite/intake x2 weeks PTA with UBW ~169lb x2 weeks ago.  Suspect weight loss was over longer period of time.  Pt under heavy blankets, however, with noticeable temporal and buccal wasting.  Suspect severe malnutrition, however, pt denies chronic decreased oral intake to attribute fat wasting.       Findings as based on:  •  Comprehensive nutrition assessment and consultation    Treatment:    1. Continue current Regular diet order.    2. Honor food preferences  3. Continue Ensure Enlive TID to provide an additional 1050kcal, 60g pro daily  4. Continue multivitamin daily  5. Appreciate continued assistance and encouragement with meals to promote oral intake  6. Encouraged increased PO intake, as tolerated, to better meet estimated nutrient needs  7. Optimize nutrition and hydration status within the goals of care    PROVIDER Section:     By signing this assessment you are acknowledging and agree with the diagnosis/diagnoses assigned by the Registered Dietitian    Comments:

## 2018-06-24 NOTE — DIETITIAN INITIAL EVALUATION ADULT. - PROBLEM SELECTOR PLAN 6
Family uncertain patient really has seizures. Currently on Keppra 500mg BID  c/w keppra 500mg BID  provide neurologist at discharge- patients family request transition from [facility] to [facility]

## 2018-06-25 LAB
-  AMPICILLIN/SULBACTAM: SIGNIFICANT CHANGE UP
-  AMPICILLIN: SIGNIFICANT CHANGE UP
-  CEFAZOLIN: SIGNIFICANT CHANGE UP
-  CEFTRIAXONE: SIGNIFICANT CHANGE UP
-  CIPROFLOXACIN: SIGNIFICANT CHANGE UP
-  GENTAMICIN: SIGNIFICANT CHANGE UP
-  NITROFURANTOIN: SIGNIFICANT CHANGE UP
-  PIPERACILLIN/TAZOBACTAM: SIGNIFICANT CHANGE UP
-  TOBRAMYCIN: SIGNIFICANT CHANGE UP
-  TRIMETHOPRIM/SULFAMETHOXAZOLE: SIGNIFICANT CHANGE UP
CULTURE RESULTS: SIGNIFICANT CHANGE UP
METHOD TYPE: SIGNIFICANT CHANGE UP
ORGANISM # SPEC MICROSCOPIC CNT: SIGNIFICANT CHANGE UP
ORGANISM # SPEC MICROSCOPIC CNT: SIGNIFICANT CHANGE UP
SPECIMEN SOURCE: SIGNIFICANT CHANGE UP

## 2018-06-25 RX ORDER — CEFPODOXIME PROXETIL 100 MG
1 TABLET ORAL
Qty: 12 | Refills: 0 | OUTPATIENT
Start: 2018-06-25 | End: 2018-06-30

## 2018-06-28 DIAGNOSIS — R06.02 SHORTNESS OF BREATH: ICD-10-CM

## 2018-06-28 DIAGNOSIS — R53.2 FUNCTIONAL QUADRIPLEGIA: ICD-10-CM

## 2018-06-28 DIAGNOSIS — Z79.82 LONG TERM (CURRENT) USE OF ASPIRIN: ICD-10-CM

## 2018-06-28 DIAGNOSIS — Z74.01 BED CONFINEMENT STATUS: ICD-10-CM

## 2018-06-28 DIAGNOSIS — M19.90 UNSPECIFIED OSTEOARTHRITIS, UNSPECIFIED SITE: ICD-10-CM

## 2018-06-28 DIAGNOSIS — E78.5 HYPERLIPIDEMIA, UNSPECIFIED: ICD-10-CM

## 2018-06-28 DIAGNOSIS — I26.99 OTHER PULMONARY EMBOLISM WITHOUT ACUTE COR PULMONALE: ICD-10-CM

## 2018-06-28 DIAGNOSIS — G40.909 EPILEPSY, UNSPECIFIED, NOT INTRACTABLE, WITHOUT STATUS EPILEPTICUS: ICD-10-CM

## 2018-06-28 DIAGNOSIS — Z66 DO NOT RESUSCITATE: ICD-10-CM

## 2018-06-28 DIAGNOSIS — Z95.0 PRESENCE OF CARDIAC PACEMAKER: ICD-10-CM

## 2018-06-28 DIAGNOSIS — R64 CACHEXIA: ICD-10-CM

## 2018-06-28 DIAGNOSIS — E04.9 NONTOXIC GOITER, UNSPECIFIED: ICD-10-CM

## 2018-06-28 DIAGNOSIS — I71.9 AORTIC ANEURYSM OF UNSPECIFIED SITE, WITHOUT RUPTURE: ICD-10-CM

## 2018-06-28 DIAGNOSIS — N30.00 ACUTE CYSTITIS WITHOUT HEMATURIA: ICD-10-CM

## 2018-06-28 DIAGNOSIS — I10 ESSENTIAL (PRIMARY) HYPERTENSION: ICD-10-CM

## 2022-05-24 NOTE — DIETITIAN INITIAL EVALUATION ADULT. - PROBLEM SELECTOR PROBLEM 3
Cachexia Normocytic anemia Medication monitoring encounter Medication monitoring encounter Patient Medication monitoring encounter Medication monitoring encounter

## 2022-05-31 NOTE — DISCHARGE NOTE ADULT - NSFTFATTENDCERTRD_GEN_ALL_CORE
rosa isela My signature below certifies that the above stated patient is homebound and upon completion of the Face-To-Face encounter, has the need for intermittent skilled nursing, physical therapy and/or speech or occupational therapy services in their home for their current diagnosis as outlined in their initial plan of care. These services will continue to be monitored by myself or another physician.

## 2024-12-25 NOTE — DISCHARGE NOTE ADULT - HOSPITAL COURSE
[FreeTextEntry1] : Ms. FRITZ KIMBALL, 69 year old female, never a smoker, w/ hx of Diffuse large B cell lymphoma (s/p chemo in 2016 w/Dr. Munoz), COVID-19 (treated w/Paxlovid), anxiety/depression, HLD, thrombocytopenia, vertigo, glaucoma, anemia due to chemo, and lung nodule (known since 2013). Recent scan revealing enlarging RUL nodule. Referred by Dr. Josue Munoz (heme/onc).  Now s/p FB, Right VATS, robotic assisted, RUL wedge resection x with MLND on 09/24/2024.  *Path of RUL wedge #1 reveals Minimally invasive adenocarcinoma, nonmucinous type, spanning 0.9 cm, with 0.3 cm of invasive pattern with acinar pattern. All margins and LNs are negative for tumor. [id number]. *Path of RUL wedge #2 additional margin reveals lung tissue with scattered poorly formed non-necrotizing granulomas, nonspecific.  Negative for carcinoma. *Additional findings: Airways with mild chronic inflammation and anthracotic pigment. Emphysema.  Patient is here today for follow up with imaging.   I have independently reviewed the medical records and imaging at the time of this office consultation.   Recommendations reviewed with patient during this office visit, and all questions answered; Patient instructed on the importance of follow up and verbalizes understanding.   89 year old female, bedbound after fall (Nov 2017), w/ pmhx of HTN, HLD, Seizures, pacemaker, arthritis, BIBEMS after probable choking episode at home. Patient was laying down in bed when she began choking for several minutes after taking her medications. Episode was witnessed by daughter and granddaughter, who is at bedside. Family sat her up and slapped her back when she began breathing normally. Patients symptoms already resolved by the time of arrival (lasted for 20min total) Upon arrival, temp: 98.2, HR: 112, BP 91/67 (repeat: HR 88, /75), RR: 20, O2: 91-98% on room air. Patient underwent CTA which was positive for segmental and subsegmental pulmonary emboli, without RV dilation (on CT). Troponin negative BNP minimally elevated. Patient started on Heparin gtt and admitted to [facility] for further management where she was switched to NOAC (xarelto given CrCl). She also was found to have UTI for which she received ceftriaxone and transitioned to PO upon discharge. Patient stable for discharge.